# Patient Record
Sex: FEMALE | Race: WHITE | URBAN - METROPOLITAN AREA
[De-identification: names, ages, dates, MRNs, and addresses within clinical notes are randomized per-mention and may not be internally consistent; named-entity substitution may affect disease eponyms.]

---

## 2019-05-05 PROBLEM — R87.612 LGSIL ON PAP SMEAR OF CERVIX: Status: ACTIVE | Noted: 2019-05-05

## 2019-05-05 PROBLEM — R87.810 CERVICAL HIGH RISK HPV (HUMAN PAPILLOMAVIRUS) TEST POSITIVE: Status: ACTIVE | Noted: 2019-05-05

## 2020-01-20 ENCOUNTER — OFFICE VISIT (OUTPATIENT)
Dept: FAMILY MEDICINE CLINIC | Age: 40
End: 2020-01-20
Payer: COMMERCIAL

## 2020-01-20 VITALS
BODY MASS INDEX: 19.83 KG/M2 | RESPIRATION RATE: 18 BRPM | SYSTOLIC BLOOD PRESSURE: 116 MMHG | HEART RATE: 66 BPM | OXYGEN SATURATION: 99 % | WEIGHT: 119 LBS | TEMPERATURE: 97.7 F | DIASTOLIC BLOOD PRESSURE: 76 MMHG | HEIGHT: 65 IN

## 2020-01-20 PROCEDURE — 99214 OFFICE O/P EST MOD 30 MIN: CPT | Performed by: PHYSICIAN ASSISTANT

## 2020-01-20 PROCEDURE — 69210 REMOVE IMPACTED EAR WAX UNI: CPT | Performed by: PHYSICIAN ASSISTANT

## 2020-01-20 RX ORDER — SPIRONOLACTONE 25 MG/1
TABLET ORAL
COMMUNITY
Start: 2020-01-19 | End: 2021-04-26 | Stop reason: ALTCHOICE

## 2020-01-20 NOTE — PROGRESS NOTES
20  Jacobo Mittal : 1980 Sex: female  Age 42 y.o. Subjective:  Chief Complaint   Patient presents with    Ear Problem     pt states left ear feels blocked          HPI:   Jacobo Mittal , 42 y.o. female presents to express care for evaluation of bilateral ear pain and pressure. The patient states that she has a history of cerumen impaction although this feels different. The patient went to make sure that it was not an infection. The patient did not have any drainage or discharge. Seems to be worse on the left than on the right. Patient denies any chest pain, shortness breath, abdominal pain. No lightheadedness or dizziness. The patient does not having any back pain or flank pain. The patient is eating and drinking normally. ROS:   Unless otherwise stated in this report the patient's positive and negative responses for review of systems for constitutional, eyes, ENT, cardiovascular, respiratory, gastrointestinal, neurological, , musculoskeletal, and integument systems and related systems to the presenting problem are either stated in the history of present illness or were not pertinent or were negative for the symptoms and/or complaints related to the presenting medical problem. Positives and pertinent negatives as per HPI. All others reviewed and are negative. PMH:     Past Medical History:   Diagnosis Date    Acute pharyngitis        History reviewed. No pertinent surgical history. History reviewed. No pertinent family history. Medications:     Current Outpatient Medications:     spironolactone (ALDACTONE) 25 MG tablet, , Disp: , Rfl:     Allergies:   No Known Allergies    Social History:     Social History     Tobacco Use    Smoking status: Never Smoker    Smokeless tobacco: Never Used   Substance Use Topics    Alcohol use: Yes     Comment: very little    Drug use: No       Patient lives at home.     Physical Exam:     Vitals:    20 1629   BP: 116/76 Pulse: 66   Resp: 18   Temp: 97.7 °F (36.5 °C)   SpO2: 99%   Weight: 119 lb (54 kg)   Height: 5' 5\" (1.651 m)       Exam:  Physical Exam  Nurse's notes and vital signs reviewed. The patient is not hypoxic. General: Alert, no acute distress, patient resting comfortably Patient is not toxic or lethargic. Skin: Warm, intact, no pallor noted. There is no evidence of rash at this time. Head: Normocephalic, atraumatic. Eye: Normal conjunctiva  Ears, Nose, Throat: Right tympanic membrane and left tympanic membrane were noted will be visualized because of cerumen impaction. No drainage or discharge noted. No pre- or post-auricular tenderness, erythema, or swelling noted. No rhinorrhea or congestion noted. No facial erythema. Posterior oropharynx shows no erythema, tonsillar hypertrophy, asymmetry or peritonsillar abscess and no evidence of exudate. the uvula is midline. No trismus or drooling is noted. Moist mucous membranes. Cardio: Regular Rate  Respiratory: No acute distress  Neurological: A&O x4, normal speech  Psychiatric: Cooperative         Testing:           Medical Decision Making:     The patient had quite a bit of cerumen impaction bilaterally. I discussed with her different options. The patient will try to have the earwax removed with ear curette. We were able to remove a large portion of the earwax with a ear curette. The patient also had a lavage performed. Repeat evaluation shows significant improvement. The tympanic membranes were able to be visualized and there is no evidence of cerumen impaction. The patient had no evidence of a foreign body. There is no signs of tympanic membrane erythema or perforation. We will have the patient follow-up with PCP. Return if any of the signs or symptoms worsen. Clinical Impression:   Maty Hogan was seen today for ear problem.     Diagnoses and all orders for this visit:    Bilateral hearing loss due to cerumen impaction    Otalgia, bilateral        The patient is to call for any concerns or return if any of the signs or symptoms worsen. The patient is to follow-up with PCP in the next 2-3 days for repeat evaluation repeat assessment or go directly to the emergency department.      SIGNATURE: Andrae Garland III, PA-C

## 2021-04-26 ENCOUNTER — OFFICE VISIT (OUTPATIENT)
Dept: FAMILY MEDICINE CLINIC | Age: 41
End: 2021-04-26
Payer: COMMERCIAL

## 2021-04-26 VITALS
DIASTOLIC BLOOD PRESSURE: 76 MMHG | HEART RATE: 84 BPM | OXYGEN SATURATION: 99 % | TEMPERATURE: 97.7 F | HEIGHT: 65 IN | RESPIRATION RATE: 18 BRPM | SYSTOLIC BLOOD PRESSURE: 120 MMHG | BODY MASS INDEX: 20.99 KG/M2 | WEIGHT: 126 LBS

## 2021-04-26 DIAGNOSIS — M25.572 PAIN IN LEFT ANKLE AND JOINTS OF LEFT FOOT: Primary | ICD-10-CM

## 2021-04-26 DIAGNOSIS — M79.605 LEFT LEG PAIN: ICD-10-CM

## 2021-04-26 PROCEDURE — 99214 OFFICE O/P EST MOD 30 MIN: CPT | Performed by: PHYSICIAN ASSISTANT

## 2021-04-26 RX ORDER — DROSPIRENONE AND ETHINYL ESTRADIOL 0.02-3(28)
KIT ORAL
COMMUNITY
Start: 2021-04-23

## 2021-04-26 RX ORDER — METHYLPREDNISOLONE 4 MG/1
TABLET ORAL
Qty: 1 KIT | Refills: 0 | Status: SHIPPED | OUTPATIENT
Start: 2021-04-26

## 2021-04-26 NOTE — PROGRESS NOTES
21  Jeff Adrian : 1980 Sex: female  Age 46 y.o. Subjective:  Chief Complaint   Patient presents with    Foot Pain     left          HPI:   Jeff Adrian , 46 y.o. female presents to express care for evaluation of left foot pain, ankle swelling. The patient has had the symptoms ongoing since Saturday morning. The patient states that she was fine on Friday. She really did not do any strenuous activity. The patient is not having any knee pain or hip pain. The patient is able to ambulate. The patient is not having any significant swelling of the left lower extremity but the pain does radiate the medial aspect of the left calf area. The patient is able to ambulate. The patient has not had any previous fractures or surgeries to the left foot or ankle. The patient does have some concerns for the possibility of a DVT. The patient is on birth control. The patient does not smoke. No recent travel, surgery, immobilization, history of carcinoma. Patient states that ibuprofen does make it better. ROS:   Unless otherwise stated in this report the patient's positive and negative responses for review of systems for constitutional, eyes, ENT, cardiovascular, respiratory, gastrointestinal, neurological, , musculoskeletal, and integument systems and related systems to the presenting problem are either stated in the history of present illness or were not pertinent or were negative for the symptoms and/or complaints related to the presenting medical problem. Positives and pertinent negatives as per HPI. All others reviewed and are negative. PMH:     Past Medical History:   Diagnosis Date    Acute pharyngitis        History reviewed. No pertinent surgical history. History reviewed. No pertinent family history.     Medications:     Current Outpatient Medications:     drospirenone-ethinyl estradiol (CHARLI) 3-0.02 MG per tablet, , Disp: , Rfl:     methylPREDNISolone (MEDROL DOSEPACK) 4 MG tablet, Take by mouth., Disp: 1 kit, Rfl: 0    Allergies:   No Known Allergies    Social History:     Social History     Tobacco Use    Smoking status: Never Smoker    Smokeless tobacco: Never Used   Substance Use Topics    Alcohol use: Yes     Comment: very little    Drug use: No       Patient lives at home. Physical Exam:     Vitals:    04/26/21 0805   BP: 120/76   Pulse: 84   Resp: 18   Temp: 97.7 °F (36.5 °C)   SpO2: 99%   Weight: 126 lb (57.2 kg)   Height: 5' 5\" (1.651 m)       Exam:  Physical Exam  Vital signs reviewed and nurse's notes. The patient is not hypoxic. General: Alert, no acute distress, patient resting comfortably   Skin: warm, intact, no pallor noted   Head: Normocephalic, atraumatic   Eye: Normal conjunctiva   Respiratory: No acute distress   Musculoskeletal: No obvious deformity noted to the left lower extremity or the left ankle/foot. The patient is noted to have some swelling to the anterior lateral aspect of the left ankle. Tenderness over the arch of the foot of the left foot. The patient had no significant heel pain. No palpable defect of the Achilles tendon. Pulses intact the DP/PT 2+. Normal strength with dorsiflexion plantarflexion 5/5. The patient had no cyanosis or mottling. No erythema or warmth. No lymphangitic streaking. Neurological: alert and orient x4, normal sensory and motor observed. Psychiatric: Cooperative        Testing:           Medical Decision Making:     Vital signs reviewed    Past medical history reviewed. Allergies reviewed. Medications reviewed. Patient on arrival does not appear to be in any apparent distress or discomfort. The patient had x-rays obtained in the office today and formal radiology report is pending at this time. I personally reviewed the x-ray images and did not see any evidence of acute process.     The patient will have a stat keep and call ultrasound of the left lower extremity to ensure that there is no evidence of DVT. The patient was placed into a short cam walker. The patient went to hold off on referral to podiatry. The patient will try the Medrol Dosepak and the boot at home. The patient will ice, rest, elevate. Patient will be updated with the formal radiology report. There does seem to be a cyst in the distal third metatarsal area. I discussed this with the patient we reviewed the images in the office. We did discuss the potential of occult fracture with the patient and the need for followup. The patient understands the need for follow-up and repeat evaluation. The patient was educated on RICE therapy, nsaids, and tylenol. The patient is to return if any of the signs or symptoms worsen. The patient is to follow-up with PCP in the next 2-3 days for repeat evaluation repeat assessment or go directly to the emergency department. Clinical Impression:   Freeman Landaverde was seen today for foot pain. Diagnoses and all orders for this visit:    Pain in left ankle and joints of left foot  -     XR FOOT LEFT (MIN 3 VIEWS); Future  -     XR ANKLE LEFT (MIN 3 VIEWS); Future    Left leg pain  -     US DUP LOWER EXTREMITY LEFT ANA M; Future    Other orders  -     methylPREDNISolone (MEDROL DOSEPACK) 4 MG tablet; Take by mouth. The patient is to call for any concerns or return if any of the signs or symptoms worsen. The patient is to follow-up with PCP in the next 2-3 days for repeat evaluation repeat assessment or go directly to the emergency department.      SIGNATURE: Hector Aguilera III, PA-C